# Patient Record
Sex: FEMALE | Race: WHITE
[De-identification: names, ages, dates, MRNs, and addresses within clinical notes are randomized per-mention and may not be internally consistent; named-entity substitution may affect disease eponyms.]

---

## 2021-02-15 ENCOUNTER — HOSPITAL ENCOUNTER (EMERGENCY)
Dept: HOSPITAL 47 - EC | Age: 26
Discharge: HOME | End: 2021-02-15
Payer: COMMERCIAL

## 2021-02-15 VITALS
TEMPERATURE: 98.5 F | DIASTOLIC BLOOD PRESSURE: 79 MMHG | HEART RATE: 78 BPM | SYSTOLIC BLOOD PRESSURE: 137 MMHG | RESPIRATION RATE: 18 BRPM
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
## 2021-02-15 DIAGNOSIS — S61.412A: Primary | ICD-10-CM

## 2021-02-15 DIAGNOSIS — F17.200: ICD-10-CM

## 2021-02-15 PROCEDURE — 99282 EMERGENCY DEPT VISIT SF MDM: CPT

## 2021-02-15 PROCEDURE — 12002 RPR S/N/AX/GEN/TRNK2.6-7.5CM: CPT

## 2021-02-15 NOTE — ED
Wound/Laceration HPI





- General


Chief Complaint: Wound/Laceration


Stated Complaint: L Hand Injury


Time Seen by Provider: 02/15/21 08:36


Source: patient


Mode of arrival: ambulatory


Limitations: no limitations





- History of Present Illness


Initial Comments: 





Patient is a 25-year-old female presenting to the emergency Department with 

complaints of a laceration to her left hand just prior to arrival.  Patient 

states they've been working in the basement and she excellently sliced her hand 

with a .  Patient states her tetanus vaccine is up-to-date.  She is 

not on blood thinners.  Bleeding is controlled at this time with a bandage.  She

has no further complaints.  





- Related Data


                                    Allergies











Allergy/AdvReac Type Severity Reaction Status Date / Time


 


No Known Allergies Allergy   Verified 02/15/21 08:35














Review of Systems


ROS Statement: 


Those systems with pertinent positive or pertinent negative responses have been 

documented in the HPI.





ROS Other: All systems not noted in ROS Statement are negative.





Past Medical History


Past Medical History: No Reported History


History of Any Multi-Drug Resistant Organisms: None Reported


Past Surgical History: No Surgical Hx Reported


Past Psychological History: No Psychological Hx Reported


Smoking Status: Current every day smoker


Past Alcohol Use History: None Reported


Past Drug Use History: Marijuana





General Exam





- General Exam Comments


Initial Comments: 





GENERAL: 


Patient is well-developed and well-nourished.  Patient is nontoxic and in no 

acute distress.





HEAD: 


Atraumatic, normocephalic.





EYES:


Pupils equal round and reactive to light, extraocular movements intact, sclera 

anicteric, conjunctiva are normal.  Eyelids were unremarkable.





ENT: 


Nares patent, oropharynx clear without exudates.  Moist mucous membranes.





NECK: 


Normal range of motion, supple without lymphadenopathy or JVD.





LUNGS:


Unlabored respirations.  Breath sounds clear to auscultation bilaterally and 

equal.  No wheezes rales or rhonchi.





HEART:


Regular rate and rhythm without murmurs, rubs or gallops.





ABDOMEN: 


Soft, nontender, normoactive bowel sounds.  No guarding, no rebound.  No masses 

appreciated.





: Deferred 





MUSCULOSKELETAL: 


Patient has full range of motion of her left hand and left fingers, including 

full extension of her left thumb.  Normal extremities with adequate strength and

normal range of motion, no pitting or edema.  No clubbing or cyanosis.





NEUROLOGICAL: 


Patient is alert and oriented x 3.  Normal speech, normal gait.   





PSYCH:


Normal mood, normal affect.





SKIN:


 Warm, Dry, normal turgor, no rashes.  Patient has a 3 cm laceration to her left

hand proximal to her first MCP joint.


Limitations: no limitations





Course


                                   Vital Signs











  02/15/21





  08:31


 


Temperature 98.5 F


 


Pulse Rate 78


 


Respiratory 18





Rate 


 


Blood Pressure 137/79


 


O2 Sat by Pulse 99





Oximetry 














Procedures





- Laceration


  ** Laceration #1


Consent Obtained: verbal consent


Indication: laceration


Site: hand (left hand, proximal to 1st mcp joint)


Size (cm): 3


Description: linear


Depth: simple, single layer


Anesthetic Used: lidocaine 1%


Anesthesia Technique: local infiltration


Amount (mls): 5


Pre-repair: irrigated extensively


Type of Sutures: nylon


Size of Sutures: 5-0


Number of Sutures: 8


Technique: simple, interrupted


Patient Tolerated Procedure: well





Medical Decision Making





- Medical Decision Making





Patient is a 25-year-old female presenting with a 3 cm laceration to her left 

hand, just proximal to her first MCT joint.  She cut this with a  just

prior to arrival.  Her tetanus vaccine is up-to-date.  Bleeding is controlled.  

Patient's wound was cleaned, closed with 8, 5-0 sutures.  Patient tolerated 

procedure well.  Topical antibiotic and bandage was applied.  She'll sutures 

removed in 7-10 days.  Patient is stable for discharge.  Patient is in agreement

with this plan of care.  Return parameters were discussed with the patient and 

they verbalized understanding.  Case discussed with Dr. Valle. 





Disposition


Clinical Impression: 


 Laceration of left hand





Disposition: HOME SELF-CARE


Condition: Stable


Instructions (If sedation given, give patient instructions):  Care For Your 

Stitches (ED)


Additional Instructions: 


Please return to the Emergency Department if symptoms worsen or any other 

concerns.


Deep wound clean and dry.  Please cover while working.  


Stitches need to be removed in 7-10 days.


Is patient prescribed a controlled substance at d/c from ED?: No


Referrals: 


Nonstaff,Physician [REFERRING] - 1-2 days